# Patient Record
Sex: FEMALE | Race: WHITE
[De-identification: names, ages, dates, MRNs, and addresses within clinical notes are randomized per-mention and may not be internally consistent; named-entity substitution may affect disease eponyms.]

---

## 2018-06-09 ENCOUNTER — HOSPITAL ENCOUNTER (EMERGENCY)
Dept: HOSPITAL 47 - EC | Age: 33
Discharge: HOME | End: 2018-06-09
Payer: MEDICARE

## 2018-06-09 VITALS — HEART RATE: 82 BPM | DIASTOLIC BLOOD PRESSURE: 70 MMHG | TEMPERATURE: 97 F | SYSTOLIC BLOOD PRESSURE: 103 MMHG

## 2018-06-09 VITALS — RESPIRATION RATE: 18 BRPM

## 2018-06-09 DIAGNOSIS — G40.909: ICD-10-CM

## 2018-06-09 DIAGNOSIS — Z91.09: ICD-10-CM

## 2018-06-09 DIAGNOSIS — M79.672: Primary | ICD-10-CM

## 2018-06-09 DIAGNOSIS — Z79.899: ICD-10-CM

## 2018-06-09 DIAGNOSIS — F31.9: ICD-10-CM

## 2018-06-09 PROCEDURE — 99283 EMERGENCY DEPT VISIT LOW MDM: CPT

## 2018-06-09 NOTE — ED
General Adult HPI





- General


Chief complaint: Extremity Injury, Lower


Stated complaint: left foot pain; lump


Time Seen by Provider: 06/09/18 10:08


Source: family, RN notes reviewed


Mode of arrival: wheelchair


Limitations: language barrier, altered mental status, physical limitation





- History of Present Illness


Initial comments: 





Patient's 33-year-old female significant past medical history for Angelman 

syndrome, presenting with a caretaker for possible injury to the left foot.  

Caretaker provides history as patient is nonverbal.  Caretaker states that they 

were getting her change this morning and change in brief and when she stood up 

the left foot seemed to buckle and she says "ow".  Caretaker states that she 

noticed a bump to the left side of the foot unsure if this is related.  They 

deny any other complaints or symptoms.  Denies any other change in behavior.  

He denies any recent fever, nausea vomiting, diarrhea.





- Related Data


 Home Medications











 Medication  Instructions  Recorded  Confirmed


 


Acetaminophen [Tylenol] 1,000 mg PO Q8H PRN 11/09/15 06/09/18


 


Calcium Carbonate/Vitamin D3 1 tab PO HS@2000 11/09/15 06/09/18





[Calcium 600 + Vit D Tablet]   


 


Cetirizine HCl 10 mg PO DAILY@0800 11/09/15 06/09/18


 


Divalproex Sodium [Depakote 500 mg PO TID 11/09/15 06/09/18





Sprinkle]   


 


Fluticasone Nasal Spray [Flonase 1 spray EA NOSTRIL DAILY PRN 11/09/15 06/09/18





Nasal Spray]   


 


LORazepam [Ativan] 0.5 mg PO TID PRN 11/09/15 06/09/18


 


Certavite Senior Vitamin 1 tab PO DAILY@0800 11/29/17 06/09/18


 


Levothyroxine Sodium [Synthroid] 50 mcg PO DAILY@0800 11/29/17 06/09/18


 


Melatonin 3 mg PO HS@2000 11/29/17 06/09/18


 


Norgestimate-Ethinyl Estradiol 1 tab PO HS@2000 11/29/17 06/09/18





[Tri-Sprintec Tablet]   











 Allergies











Allergy/AdvReac Type Severity Reaction Status Date / Time


 


pets Allergy Mild Unknown Uncoded 06/09/18 10:03














Review of Systems


ROS Statement: 


Those systems with pertinent positive or pertinent negative responses have been 

documented in the HPI.





ROS Other: All systems not noted in ROS Statement are negative.





Past Medical History


Past Medical History: Seizure Disorder


Additional Past Medical History / Comment(s): excessive salivation,Angelman 

Syndrome-petit mal seizures last seizure approx 5 yrs ago-special needs,

nonverbal,ambulatory,steroids w/in 3 mos,scoliosis


History of Any Multi-Drug Resistant Organisms: None Reported


Past Surgical History: Back Surgery


Additional Past Surgical History / Comment(s): 2 rods in back,wisdom teeth


Past Anesthesia/Blood Transfusion Reactions: No Reported Reaction


Past Psychological History: Bipolar


Smoking Status: Never smoker


Past Alcohol Use History: None Reported


Past Drug Use History: None Reported





- Past Family History


  ** Father


Family Medical History: Myocardial Infarction (MI), Osteoarthritis (OA)


Additional Family Medical History / Comment(s): MI X2,HEART STENTS





  ** Mother


Additional Family Medical History / Comment(s): SCOLIOSIS





General Exam





- General Exam Comments


Initial Comments: 





General:  The patient is awake and alert, in no distress, and does not appear 

acutely ill.   


Neck:  The neck is supple, there is no tenderness or JVD.  


Musculoskeletal: Normal appearance of left foot no obvious deformity.  Patient 

shows full range of motion.  Pedal pulses 2+ bilaterally.  No specific bony 

tenderness on exam.


Skin:  Skin is warm and dry and no rashes or lesions are noted.   


Limitations: language barrier, altered mental status, physical limitation





Course


 Vital Signs











  06/09/18





  09:57


 


Temperature 97.6 F


 


Pulse Rate 86


 


Respiratory 18





Rate 


 


Blood Pressure 100/73


 


O2 Sat by Pulse 97





Oximetry 














Medical Decision Making





- Medical Decision Making





Patient's x-ray of the left foot reviewed and does show a periosteal reaction 

of the proximal second metatarsal most common related to stress reaction/

incomplete stress fracture.  MRI could be performed for further evaluation and 

a nonemergent basis as read by radiologist . Case discussed in detail 

with attending physician Dr. Duff.  Patient able to bear weight here in 

emergency room.  Did discuss these findings with caregiver at bedside and 

advised to follow-up with the family doctor over the next 2 days.  Advised to 

use Ace wrap during the day with ibuprofen for pain..





Disposition


Clinical Impression: 


 Foot pain





Disposition: HOME SELF-CARE


Condition: Good


Instructions:  Foot Sprain (ED)


Additional Instructions: 


Please follow-up with family physician of days.  Please use Ace wrap during the 

day.  Please use ibuprofen for pain as needed.  Please return to emergency room 

if the symptoms increase or worsen or for any other concerns.


Is patient prescribed a controlled substance at d/c from ED?: No


Referrals: 


Chalino Gomez MD [Primary Care Provider] - 1-2 days


Time of Disposition: 11:39

## 2018-06-09 NOTE — XR
EXAMINATION TYPE: XR foot complete LT

 

DATE OF EXAM: 6/9/2018

 

CLINICAL HISTORY: Left foot pain

 

TECHNIQUE: Frontal, lateral, and oblique images of the left foot are obtained.

 

COMPARISON: None

 

FINDINGS:  There is no U dislocation evident in the left foot. Periosteal reaction is noted of the pr
oximal diaphysis and metaphysis of the second digit laterally. This typically relates to stress react
ion/stress fracture The joint spaces in the left foot appear within normal limits.  The overlying sof
t tissue appears unremarkable.

 

IMPRESSION: Periosteal reaction of the proximal second metatarsal most commonly related to stress aye
ction/incomplete stress fracture. MR could be performed for further evaluation and a nonemergent anuj ruiz

## 2018-06-29 ENCOUNTER — HOSPITAL ENCOUNTER (EMERGENCY)
Dept: HOSPITAL 47 - EC | Age: 33
Discharge: HOME | End: 2018-06-29
Payer: MEDICARE

## 2018-06-29 VITALS — RESPIRATION RATE: 20 BRPM | HEART RATE: 62 BPM | TEMPERATURE: 98 F

## 2018-06-29 DIAGNOSIS — F31.9: ICD-10-CM

## 2018-06-29 DIAGNOSIS — R21: Primary | ICD-10-CM

## 2018-06-29 DIAGNOSIS — Z79.899: ICD-10-CM

## 2018-06-29 DIAGNOSIS — Z88.6: ICD-10-CM

## 2018-06-29 DIAGNOSIS — G40.409: ICD-10-CM

## 2018-06-29 DIAGNOSIS — Z88.8: ICD-10-CM

## 2018-06-29 DIAGNOSIS — Z79.3: ICD-10-CM

## 2018-06-29 DIAGNOSIS — Z91.09: ICD-10-CM

## 2018-06-29 PROCEDURE — 72170 X-RAY EXAM OF PELVIS: CPT

## 2018-06-29 PROCEDURE — 99283 EMERGENCY DEPT VISIT LOW MDM: CPT

## 2018-06-29 NOTE — XR
EXAMINATION TYPE: XR knee limited bilateral

 

DATE OF EXAM: 6/29/2018

 

COMPARISON: NONE

 

HISTORY: Leg pain

 

TECHNIQUE: 4 views

 

FINDINGS: There is no fracture nor dislocation. Joint spaces are fairly normal. There is no sign of j
oint effusion. There is soft tissue swelling anterior to the knee joint on the lateral view.

 

IMPRESSION: Anterior soft tissue swelling. No fracture.

## 2018-06-29 NOTE — XR
EXAMINATION TYPE: XR pelvis AP view

 

DATE OF EXAM: 6/29/2018

 

COMPARISON: NONE

 

HISTORY: Hip pain

 

TECHNIQUE: Single view

 

FINDINGS: The pelvic ring is intact. Proximal femurs and hip joints are intact. There is no sign of h
ip dysplasia. Sacroiliac joints are intact.

 

IMPRESSION: Negative pelvis exam.

## 2018-06-29 NOTE — ED
Extremity Problem HPI





- General


Chief complaint: Extremity Problem,Nontraumatic


Stated complaint: Right Leg Pain


Time Seen by Provider: 06/29/18 18:20


Source: family, Caregiver


Mode of arrival: wheelchair


Limitations: no limitations





- History of Present Illness


Initial comments: 


This is a 33-year-old female with Angelman syndrome and past medical history of 

seizures and previous ZAINA who was brought in today by caretakers from group 

home for rash to the right anterior thigh.  History was obtained from 

caretakers who state that she attends a day program, and today they were in the 

sun all day at a picnic.  When they went to go change her brief when she 

returned they noticed a rash to the right anterior thigh, and during ambulation 

they thought she was slightly favoring her left leg but it was hard to tell.  

He thought it might be related to the rash they saw.  So they took her to [facility] around 3:30 PM where she was told to come to the emergency department 

for possible imaging.  The patient arrived via wheelchair is her normal mode of 

ambulation when she is not at her group home where she ambulates on her knees.  

At arrival her vital signs within normal limits.  Her takers deny any change in 

behavior or mental status, evidence of diaphoresis, or vomiting.  Unable to 

obtain history or review of systems from patient as she is nonverbal due to her 

Angelman syndrome.








- Related Data


 Home Medications











 Medication  Instructions  Recorded  Confirmed


 


Acetaminophen [Tylenol] 1,000 mg PO Q8H PRN 11/09/15 06/09/18


 


Calcium Carbonate/Vitamin D3 1 tab PO HS@2000 11/09/15 06/09/18





[Calcium 600 + Vit D Tablet]   


 


Cetirizine HCl 10 mg PO DAILY@0800 11/09/15 06/09/18


 


Divalproex Sodium [Depakote 500 mg PO TID 11/09/15 06/09/18





Sprinkle]   


 


Fluticasone Nasal Spray [Flonase 1 spray EA NOSTRIL DAILY PRN 11/09/15 06/09/18





Nasal Spray]   


 


LORazepam [Ativan] 0.5 mg PO TID PRN 11/09/15 06/09/18


 


Certavite Senior Vitamin 1 tab PO DAILY@0800 11/29/17 06/09/18


 


Levothyroxine Sodium [Synthroid] 50 mcg PO DAILY@0800 11/29/17 06/09/18


 


Melatonin 3 mg PO HS@2000 11/29/17 06/09/18


 


Norgestimate-Ethinyl Estradiol 1 tab PO HS@2000 11/29/17 06/09/18





[Tri-Sprintec Tablet]   











 Allergies











Allergy/AdvReac Type Severity Reaction Status Date / Time


 


ibuprofen Allergy  Unknown Verified 06/29/18 17:36


 


lithium Allergy  Unknown Verified 06/29/18 17:36


 


naproxen [From Aleve] Allergy  Unknown Verified 06/29/18 17:36


 


olanzapine [From Zyprexa] Allergy  Unknown Verified 06/29/18 17:36


 


pets Allergy Mild Unknown Uncoded 06/29/18 17:35














Review of Systems


ROS Statement: 


Those systems with pertinent positive or pertinent negative responses have been 

documented in the HPI.





ROS Other: All systems not noted in ROS Statement are negative.





Past Medical History


Past Medical History: Seizure Disorder


Additional Past Medical History / Comment(s): excessive salivation,Angelman 

Syndrome-petit mal seizures last seizure approx 5 yrs ago-special needs,

nonverbal,ambulatory,steroids w/in 3 mos,scoliosis


History of Any Multi-Drug Resistant Organisms: None Reported


Past Surgical History: Back Surgery


Additional Past Surgical History / Comment(s): 2 rods in back,wisdom teeth


Past Anesthesia/Blood Transfusion Reactions: No Reported Reaction


Past Psychological History: Bipolar


Smoking Status: Never smoker


Past Alcohol Use History: None Reported


Past Drug Use History: None Reported





- Past Family History


  ** Father


Family Medical History: Myocardial Infarction (MI), Osteoarthritis (OA)


Additional Family Medical History / Comment(s): MI X2,HEART STENTS





  ** Mother


Additional Family Medical History / Comment(s): SCOLIOSIS





General Exam


Limitations: no limitations





Course


 Vital Signs











  06/29/18





  17:30


 


Temperature 98.0 F


 


Pulse Rate 62


 


Respiratory 20





Rate 


 


O2 Sat by Pulse 97





Oximetry 














Medical Decision Making





- Medical Decision Making


This is a 33-year-old female with Angelman syndrome and past medical history of 

seizures and previous ZAINA who was brought in today by caretakers from group 

home for rash to the right anterior thigh.  History was obtained from 

caretakers who state that she attends a day program, and today they were in the 

sun all day at a picnic.  When they went to go change her brief when she 

returned they noticed a rash to the right anterior thigh, and during ambulation 

they thought she was slightly favoring her left leg but it was hard to tell.  

He thought it might be related to the rash they saw.  So they took her to [facility] around 3:30 PM where she was told to come to the emergency department 

for possible imaging.  The patient arrived via wheelchair is her normal mode of 

ambulation when she is not at her group home where she ambulates on her knees.  

At arrival her vital signs within normal limits.  Her takers deny any change in 

behavior or mental status, evidence of diaphoresis, or vomiting.  Unable to 

obtain history or review of systems from patient as she is nonverbal due to her 

Angelman syndrome.  Patient was able to ambulate from the wheelchair to the bed 

putting weight on both feet without signs of discomfort. Physical examination 

there is a large reticular rash to the right anterior thigh, the rash is not 

warm to touch.  There is no evidence of vesicles or draining.  She is afebrile.

  There is reddening of the skin of the upper extremities bilaterally this 

appears to be sun burn-mentioned that she had been the sun all day.  Physical 

examination of the hips knees and ankles revealed no evidence of erythema, 

swelling or obvious deformities of the overlying skin, patient did not show 

signs of pain to palpation of the hips knees and ankles bilaterally.  Patient 

had full range of motion of hips knees and ankles bilaterally.  Patient was 

able to bear weight and ambulate from the wheel chair to bed.  X-rays of the 

pelvis and knees bilaterally were obtained due to caretakers mentioning that 

they thought she was slightly favoring the left leg-imaging returned without 

abnormalities.  Patient was seen by attending Dr. Mitchell.  The daughter is 

appropriate given the patient's stable condition she follow-up with her primary 

care 1-2 days if symptoms don't resolve and return to emergency department if 

they worsen.  Caretakers agree with this plan and patient was discharged in 

stable condition, ambulating without difficulty stretcher to wheelchair upon 

discharge.








Disposition


Clinical Impression: 


 Rash and nonspecific skin eruption





Narrative: 


Please follow-up with family doctor in the next 2 days of symptoms have not 

improved.  Please return to emergency room if the symptoms increase or worsen 

or for any other concerns.


Disposition: HOME SELF-CARE


Condition: Good


Instructions:  Acute Rash (ED)


Additional Instructions: 


Please follow-up with family doctor in the next 2 days of symptoms have not 

improved.  Please return to emergency room if the symptoms increase or worsen 

or for any other concerns.


Is patient prescribed a controlled substance at d/c from ED?: No


Referrals: 


Chalino Gomez MD [Primary Care Provider] - 1-2 days

## 2020-05-15 ENCOUNTER — HOSPITAL ENCOUNTER (EMERGENCY)
Dept: HOSPITAL 47 - EC | Age: 35
LOS: 1 days | Discharge: HOME | End: 2020-05-16
Payer: MEDICARE

## 2020-05-15 VITALS — TEMPERATURE: 97.9 F

## 2020-05-15 DIAGNOSIS — Z79.899: ICD-10-CM

## 2020-05-15 DIAGNOSIS — G40.802: Primary | ICD-10-CM

## 2020-05-15 DIAGNOSIS — Z88.8: ICD-10-CM

## 2020-05-15 DIAGNOSIS — E86.0: ICD-10-CM

## 2020-05-15 DIAGNOSIS — R79.89: ICD-10-CM

## 2020-05-15 DIAGNOSIS — Z79.3: ICD-10-CM

## 2020-05-15 DIAGNOSIS — F31.9: ICD-10-CM

## 2020-05-15 DIAGNOSIS — Z91.048: ICD-10-CM

## 2020-05-15 DIAGNOSIS — Z88.6: ICD-10-CM

## 2020-05-15 LAB
ALBUMIN SERPL-MCNC: 3.2 G/DL (ref 3.5–5)
ALP SERPL-CCNC: 43 U/L (ref 38–126)
ALT SERPL-CCNC: 17 U/L (ref 4–34)
ANION GAP SERPL CALC-SCNC: 4 MMOL/L
AST SERPL-CCNC: 37 U/L (ref 14–36)
BASOPHILS # BLD AUTO: 0 K/UL (ref 0–0.2)
BASOPHILS NFR BLD AUTO: 0 %
BUN SERPL-SCNC: 30 MG/DL (ref 7–17)
CALCIUM SPEC-MCNC: 9.1 MG/DL (ref 8.4–10.2)
CHLORIDE SERPL-SCNC: 100 MMOL/L (ref 98–107)
CO2 SERPL-SCNC: 30 MMOL/L (ref 22–30)
EOSINOPHIL # BLD AUTO: 0.2 K/UL (ref 0–0.7)
EOSINOPHIL NFR BLD AUTO: 4 %
ERYTHROCYTE [DISTWIDTH] IN BLOOD BY AUTOMATED COUNT: 4.26 M/UL (ref 3.8–5.4)
ERYTHROCYTE [DISTWIDTH] IN BLOOD: 12.8 % (ref 11.5–15.5)
GLUCOSE BLD-MCNC: 96 MG/DL (ref 75–99)
GLUCOSE SERPL-MCNC: 104 MG/DL (ref 74–99)
HCT VFR BLD AUTO: 42.5 % (ref 34–46)
HGB BLD-MCNC: 13.4 GM/DL (ref 11.4–16)
LYMPHOCYTES # SPEC AUTO: 2.8 K/UL (ref 1–4.8)
LYMPHOCYTES NFR SPEC AUTO: 47 %
MAGNESIUM SPEC-SCNC: 1.7 MG/DL (ref 1.6–2.3)
MCH RBC QN AUTO: 31.5 PG (ref 25–35)
MCHC RBC AUTO-ENTMCNC: 31.5 G/DL (ref 31–37)
MCV RBC AUTO: 99.9 FL (ref 80–100)
MONOCYTES # BLD AUTO: 0.4 K/UL (ref 0–1)
MONOCYTES NFR BLD AUTO: 6 %
NEUTROPHILS # BLD AUTO: 2.4 K/UL (ref 1.3–7.7)
NEUTROPHILS NFR BLD AUTO: 41 %
PLATELET # BLD AUTO: 168 K/UL (ref 150–450)
POTASSIUM SERPL-SCNC: 4.6 MMOL/L (ref 3.5–5.1)
PROT SERPL-MCNC: 6.3 G/DL (ref 6.3–8.2)
SODIUM SERPL-SCNC: 134 MMOL/L (ref 137–145)
WBC # BLD AUTO: 5.9 K/UL (ref 3.8–10.6)

## 2020-05-15 PROCEDURE — 36415 COLL VENOUS BLD VENIPUNCTURE: CPT

## 2020-05-15 PROCEDURE — 83605 ASSAY OF LACTIC ACID: CPT

## 2020-05-15 PROCEDURE — 99285 EMERGENCY DEPT VISIT HI MDM: CPT

## 2020-05-15 PROCEDURE — 85025 COMPLETE CBC W/AUTO DIFF WBC: CPT

## 2020-05-15 PROCEDURE — 96360 HYDRATION IV INFUSION INIT: CPT

## 2020-05-15 PROCEDURE — 70450 CT HEAD/BRAIN W/O DYE: CPT

## 2020-05-15 PROCEDURE — 93005 ELECTROCARDIOGRAM TRACING: CPT

## 2020-05-15 PROCEDURE — 80053 COMPREHEN METABOLIC PANEL: CPT

## 2020-05-15 PROCEDURE — 80178 ASSAY OF LITHIUM: CPT

## 2020-05-15 PROCEDURE — 80164 ASSAY DIPROPYLACETIC ACD TOT: CPT

## 2020-05-15 PROCEDURE — 83735 ASSAY OF MAGNESIUM: CPT

## 2020-05-15 NOTE — CT
EXAMINATION TYPE: CT brain wo con

 

DATE OF EXAM: 5/15/2020

 

COMPARISON: 8/28/2012

 

HISTORY: seizure

 

CT DLP: 1275.4 mGycm

Automated exposure control for dose reduction was used.

 

Ventricles are top normal in size. There is no mass effect nor midline shift. There is no sign of int
racranial hemorrhage. The calvarium is intact.

 

IMPRESSION:

No acute intracranial abnormality. No change.

## 2020-05-15 NOTE — ED
Seizure HPI





- General


Chief Complaint: Seizure


Stated Complaint: seizure


Time Seen by Provider: 05/15/20 21:32


Source: EMS, Caregiver


Mode of arrival: EMS


Limitations: altered mental status, physical limitation





- History of Present Illness


Initial Comments: 





Patient is a 35-year-old female with history of epilepsy presenting to the 

emergency department with a chief complaint of a seizure.  Patient presents to 

the ED via EMS with caregiver.  Patient is nonverbal and lives in assisted 

living facility.  Caregiver states the patient typically shakes her head back 

and forth at baseline, however today she was shaking the morning usual and then 

also she went "stiff".  They had to bring her down to the ground and afterwards 

she felt tired.  Caregiver stated the patient possibly suffered a second seizure

with similar symptoms.  Patient did have fatigue afterward.  They're unsure 

whether the patient urinated herself.  There is no signs of tonic-clonic 

activity.  Caregiver states her last seizure was approximately 6 years ago.  She

does see a neurologist but has not seen one recently.  Patient has been eating 

without issue but she does have poor liquid intake.





- Related Data


                                Home Medications











 Medication  Instructions  Recorded  Confirmed


 


Acetaminophen [Tylenol] 1,000 mg PO Q8H PRN 11/09/15 06/09/18


 


Calcium Carbonate/Vitamin D3 1 tab PO HS@2000 11/09/15 06/09/18





[Calcium 600 + Vit D Tablet]   


 


Cetirizine HCl 10 mg PO DAILY@0800 11/09/15 06/09/18


 


Divalproex Sodium [Depakote 500 mg PO TID 11/09/15 06/09/18





Sprinkle]   


 


Fluticasone Nasal Spray [Flonase 1 spray EA NOSTRIL DAILY PRN 11/09/15 06/09/18





Nasal Spray]   


 


LORazepam [Ativan] 0.5 mg PO TID PRN 11/09/15 06/09/18


 


Certavite Senior Vitamin 1 tab PO DAILY@0800 11/29/17 06/09/18


 


Levothyroxine Sodium [Synthroid] 50 mcg PO DAILY@0800 11/29/17 06/09/18


 


Melatonin 3 mg PO HS@2000 11/29/17 06/09/18


 


Norgestimate-Ethinyl Estradiol 1 tab PO HS@2000 11/29/17 06/09/18





[Tri-Sprintec Tablet]   











                                    Allergies











Allergy/AdvReac Type Severity Reaction Status Date / Time


 


ibuprofen Allergy  Unknown Verified 05/15/20 20:37


 


lithium Allergy  Unknown Verified 05/15/20 20:37


 


naproxen [From Aleve] Allergy  Unknown Verified 05/15/20 20:37


 


olanzapine [From Zyprexa] Allergy  Unknown Verified 05/15/20 20:37


 


pets Allergy Mild Unknown Uncoded 05/15/20 20:37














Review of Systems


ROS Statement: 


Those systems with pertinent positive or pertinent negative responses have been 

documented in the HPI.





ROS Other: All systems not noted in ROS Statement are negative.





Past Medical History


Past Medical History: Seizure Disorder


Additional Past Medical History / Comment(s): excessive salivation,Angelman 

Syndrome-petit mal seizures last seizure approx 5 yrs ago-special 

needs,nonverbal,ambulatory,steroids w/in 3 mos,scoliosis


History of Any Multi-Drug Resistant Organisms: None Reported


Past Surgical History: Back Surgery


Additional Past Surgical History / Comment(s): 2 rods in back,wisdom teeth


Past Anesthesia/Blood Transfusion Reactions: No Reported Reaction


Past Psychological History: Bipolar


Smoking Status: Never smoker


Past Alcohol Use History: None Reported


Past Drug Use History: None Reported





- Past Family History


  ** Father


Family Medical History: Myocardial Infarction (MI), Osteoarthritis (OA)


Additional Family Medical History / Comment(s): MI X2,HEART STENTS





  ** Mother


Additional Family Medical History / Comment(s): SCOLIOSIS





General Exam


Limitations: altered mental status, physical limitation


General appearance: alert, in no apparent distress


Head exam: Present: atraumatic, normocephalic, normal inspection


Eye exam: Present: normal appearance, PERRL, EOMI


Pupils: Present: normal accommodation


ENT exam: Present: normal exam, normal oropharynx (no tongue trauma ), mucous 

membranes moist


Neck exam: Present: normal inspection, full ROM


Respiratory exam: Present: normal lung sounds bilaterally


Cardiovascular Exam: Present: regular rate, normal rhythm, normal heart sounds


Extremities exam: Present: normal inspection, full ROM


Back exam: Present: normal inspection, full ROM


Neurological exam: Present: alert


Psychiatric exam: Present: other (Nonverbal)


Skin exam: Present: warm, dry, intact, normal color





Course


                                   Vital Signs











  05/15/20 05/15/20 05/15/20





  20:33 22:30 23:00


 


Temperature 97.9 F  


 


Pulse Rate 78 88 78


 


Respiratory 18 20 16





Rate   


 


Blood Pressure 107/81 110/56 111/65


 


O2 Sat by Pulse 95 99 98





Oximetry   














Medical Decision Making





- Medical Decision Making





Patient is a 35-year-old female presents emergency Department with a chief 

complaint of possible seizure.  Patient is nonverbal brought to the ED via EMS. 

She is accompanied by her caregiver.  Patient currently takes Depakote for her 

epilepsy.  She has not had a seizure in about 6 years according to the 

caregiver.  CBC and CMP are unremarkable aside from elevated BUN levels which I 

suspect is secondary to dehydration.  She does have dry mucous membranes and 

according to the caregiver, the patient does have poor liquid intake.  Patient 

was given a liter of fluids..  Lactate is within normal limits.  EKG shows 

normal sinus rhythm with no ST changes.  Depakote levels are within normal 

limits.  I have low suspicion that this was a true seizure.  No signs of oral 

trauma to the lateral aspect of the tongue.  CT of the brain is unremarkable.  

Return parameters were thoroughly discussed with caregiver is understanding and 

agreeable.  She was advised to follow-up with the primary care.  Case discussed 

with physician.





- Lab Data


Result diagrams: 


                                 05/15/20 22:20





                                 05/15/20 22:20


                                   Lab Results











  05/15/20 05/15/20 05/15/20 Range/Units





  22:20 22:20 22:20 


 


WBC  5.9    (3.8-10.6)  k/uL


 


RBC  4.26    (3.80-5.40)  m/uL


 


Hgb  13.4    (11.4-16.0)  gm/dL


 


Hct  42.5    (34.0-46.0)  %


 


MCV  99.9    (80.0-100.0)  fL


 


MCH  31.5    (25.0-35.0)  pg


 


MCHC  31.5    (31.0-37.0)  g/dL


 


RDW  12.8    (11.5-15.5)  %


 


Plt Count  168    (150-450)  k/uL


 


Neutrophils %  41    %


 


Lymphocytes %  47    %


 


Monocytes %  6    %


 


Eosinophils %  4    %


 


Basophils %  0    %


 


Neutrophils #  2.4    (1.3-7.7)  k/uL


 


Lymphocytes #  2.8    (1.0-4.8)  k/uL


 


Monocytes #  0.4    (0-1.0)  k/uL


 


Eosinophils #  0.2    (0-0.7)  k/uL


 


Basophils #  0.0    (0-0.2)  k/uL


 


Sodium   134 L   (137-145)  mmol/L


 


Potassium   4.6   (3.5-5.1)  mmol/L


 


Chloride   100   ()  mmol/L


 


Carbon Dioxide   30   (22-30)  mmol/L


 


Anion Gap   4   mmol/L


 


BUN   30 H   (7-17)  mg/dL


 


Creatinine   0.70   (0.52-1.04)  mg/dL


 


Est GFR (CKD-EPI)AfAm   >90   (>60 ml/min/1.73 sqM)  


 


Est GFR (CKD-EPI)NonAf   >90   (>60 ml/min/1.73 sqM)  


 


Glucose   104 H   (74-99)  mg/dL


 


POC Glucose (mg/dL)     (75-99)  mg/dL


 


POC Glu Operater ID     


 


Plasma Lactic Acid Eric    1.1  (0.7-2.0)  mmol/L


 


Calcium   9.1   (8.4-10.2)  mg/dL


 


Magnesium   1.7   (1.6-2.3)  mg/dL


 


Total Bilirubin   0.5   (0.2-1.3)  mg/dL


 


AST   37 H   (14-36)  U/L


 


ALT   17   (4-34)  U/L


 


Alkaline Phosphatase   43   ()  U/L


 


Total Protein   6.3   (6.3-8.2)  g/dL


 


Albumin   3.2 L   (3.5-5.0)  g/dL


 


Valproic Acid     ug/mL


 


Lithium     mmol/L














  05/15/20 05/15/20 05/15/20 Range/Units





  22:20 22:20 22:25 


 


WBC     (3.8-10.6)  k/uL


 


RBC     (3.80-5.40)  m/uL


 


Hgb     (11.4-16.0)  gm/dL


 


Hct     (34.0-46.0)  %


 


MCV     (80.0-100.0)  fL


 


MCH     (25.0-35.0)  pg


 


MCHC     (31.0-37.0)  g/dL


 


RDW     (11.5-15.5)  %


 


Plt Count     (150-450)  k/uL


 


Neutrophils %     %


 


Lymphocytes %     %


 


Monocytes %     %


 


Eosinophils %     %


 


Basophils %     %


 


Neutrophils #     (1.3-7.7)  k/uL


 


Lymphocytes #     (1.0-4.8)  k/uL


 


Monocytes #     (0-1.0)  k/uL


 


Eosinophils #     (0-0.7)  k/uL


 


Basophils #     (0-0.2)  k/uL


 


Sodium     (137-145)  mmol/L


 


Potassium     (3.5-5.1)  mmol/L


 


Chloride     ()  mmol/L


 


Carbon Dioxide     (22-30)  mmol/L


 


Anion Gap     mmol/L


 


BUN     (7-17)  mg/dL


 


Creatinine     (0.52-1.04)  mg/dL


 


Est GFR (CKD-EPI)AfAm     (>60 ml/min/1.73 sqM)  


 


Est GFR (CKD-EPI)NonAf     (>60 ml/min/1.73 sqM)  


 


Glucose     (74-99)  mg/dL


 


POC Glucose (mg/dL)    96  (75-99)  mg/dL


 


POC Glu Operater ID    Robinson Cody  


 


Plasma Lactic Acid Eric     (0.7-2.0)  mmol/L


 


Calcium     (8.4-10.2)  mg/dL


 


Magnesium     (1.6-2.3)  mg/dL


 


Total Bilirubin     (0.2-1.3)  mg/dL


 


AST     (14-36)  U/L


 


ALT     (4-34)  U/L


 


Alkaline Phosphatase     ()  U/L


 


Total Protein     (6.3-8.2)  g/dL


 


Albumin     (3.5-5.0)  g/dL


 


Valproic Acid   104.8   ug/mL


 


Lithium  <0.2    mmol/L














Disposition


Clinical Impression: 


 Observed seizure-like activity





Disposition: HOME SELF-CARE


Condition: Good


Instructions (If sedation given, give patient instructions):  Recurrent Seizures

in Adults (ED)


Additional Instructions: 


Follow-up with a neurologist.  Return to emergency department if symptoms 

worsen.


Is patient prescribed a controlled substance at d/c from ED?: No


Referrals: 


Chalino Gomez MD [Primary Care Provider] - 1-2 days


Time of Disposition: 00:24

## 2020-05-16 VITALS — RESPIRATION RATE: 20 BRPM | HEART RATE: 81 BPM | SYSTOLIC BLOOD PRESSURE: 108 MMHG | DIASTOLIC BLOOD PRESSURE: 56 MMHG

## 2023-04-01 ENCOUNTER — HOSPITAL ENCOUNTER (EMERGENCY)
Dept: HOSPITAL 47 - EC | Age: 38
Discharge: HOME | End: 2023-04-01
Payer: MEDICARE

## 2023-04-01 VITALS — DIASTOLIC BLOOD PRESSURE: 72 MMHG | SYSTOLIC BLOOD PRESSURE: 111 MMHG | HEART RATE: 77 BPM

## 2023-04-01 VITALS — RESPIRATION RATE: 20 BRPM | TEMPERATURE: 97.4 F

## 2023-04-01 DIAGNOSIS — Z88.6: ICD-10-CM

## 2023-04-01 DIAGNOSIS — Z79.899: ICD-10-CM

## 2023-04-01 DIAGNOSIS — G40.909: ICD-10-CM

## 2023-04-01 DIAGNOSIS — R11.10: Primary | ICD-10-CM

## 2023-04-01 DIAGNOSIS — Z91.048: ICD-10-CM

## 2023-04-01 DIAGNOSIS — Z88.8: ICD-10-CM

## 2023-04-01 PROCEDURE — 71046 X-RAY EXAM CHEST 2 VIEWS: CPT

## 2023-04-01 PROCEDURE — 99284 EMERGENCY DEPT VISIT MOD MDM: CPT

## 2023-04-01 PROCEDURE — 70360 X-RAY EXAM OF NECK: CPT

## 2023-04-01 NOTE — XR
Soft tissue neck.

 

HISTORY: Foreign body.

 

COMPARISON: None.

 

AP and lateral views of the soft tissues neck obtained.

 

FINDINGS:

 

The airway is widely patent. 

 

There is no radiopaque foreign body.

 

The epiglottis and aryepiglottic folds are normal. The retropharyngeal soft tissues are normal. There
 is no swelling of the tonsils.

 

The osseous structures are intact. There is kyphosis of the cervical spine.

## 2023-04-01 NOTE — XR
EXAMINATION TYPE: XR chest 2V

 

DATE OF EXAM: 4/1/2023

 

COMPARISON: 11/29/2017

 

HISTORY: Altered mental status

 

TECHNIQUE:  Frontal and lateral views of the chest are obtained.

 

FINDINGS:  There is no focal air space opacity, pleural effusion, or pneumothorax seen.  The cardiac 
silhouette size is within normal limits. There are Hussein rods 4 severe dextroscoliosis of the th
oracolumbar spine.

 

IMPRESSION:  No acute cardiopulmonary process.